# Patient Record
Sex: MALE | Race: WHITE
[De-identification: names, ages, dates, MRNs, and addresses within clinical notes are randomized per-mention and may not be internally consistent; named-entity substitution may affect disease eponyms.]

---

## 2019-12-18 ENCOUNTER — HOSPITAL ENCOUNTER (OUTPATIENT)
Dept: HOSPITAL 96 - M.MRI | Age: 69
End: 2019-12-18
Attending: ORTHOPAEDIC SURGERY
Payer: MEDICARE
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
## 2019-12-18 DIAGNOSIS — S83.242A: Primary | ICD-10-CM

## 2019-12-18 DIAGNOSIS — M17.12: ICD-10-CM
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
## 2019-12-18 DIAGNOSIS — Z96.652: ICD-10-CM

## 2020-01-09 LAB
ALBUMIN SERPL-MCNC: 3.5 G/DL (ref 3.4–5)
ALP SERPL-CCNC: 54 U/L (ref 46–116)
ALT SERPL-CCNC: 21 U/L (ref 30–65)
ANION GAP SERPL CALC-SCNC: 6 MMOL/L (ref 7–16)
AST SERPL-CCNC: 11 U/L (ref 15–37)
BILIRUB SERPL-MCNC: 0.5 MG/DL
BILIRUB UR-MCNC: NEGATIVE MG/DL
BUN SERPL-MCNC: 20 MG/DL (ref 7–18)
CALCIUM SERPL-MCNC: 9 MG/DL (ref 8.5–10.1)
CHLORIDE SERPL-SCNC: 102 MMOL/L (ref 98–107)
CO2 SERPL-SCNC: 28 MMOL/L (ref 21–32)
COLOR UR: YELLOW
CREAT SERPL-MCNC: 1.2 MG/DL (ref 0.6–1.3)
GLUCOSE SERPL-MCNC: 237 MG/DL (ref 70–99)
HCT VFR BLD CALC: 41.6 % (ref 42–52)
HGB BLD-MCNC: 14.4 GM/DL (ref 14–18)
INR PPP: 1.1
KETONES UR STRIP-MCNC: NEGATIVE MG/DL
MCH RBC QN AUTO: 31.9 PG (ref 26–34)
MCHC RBC AUTO-ENTMCNC: 34.6 G/DL (ref 28–37)
MCV RBC: 92.2 FL (ref 80–100)
MPV: 7.4 FL. (ref 7.2–11.1)
PLATELET COUNT*: 170 THOU/UL (ref 150–400)
POTASSIUM SERPL-SCNC: 4.2 MMOL/L (ref 3.5–5.1)
PROT SERPL-MCNC: 7 G/DL (ref 6.4–8.2)
PROT UR QL STRIP: NEGATIVE
PROTHROMBIN TIME: 10.9 SECONDS (ref 9.2–11.5)
RBC # BLD AUTO: 4.51 MIL/UL (ref 4.5–6)
RBC # UR STRIP: NEGATIVE /UL
RDW-CV: 13.6 % (ref 10.5–14.5)
SODIUM SERPL-SCNC: 136 MMOL/L (ref 136–145)
SP GR UR STRIP: >= 1.03 (ref 1–1.03)
URINE CLARITY: CLEAR
URINE GLUCOSE-RANDOM: NEGATIVE
URINE LEUKOCYTES-REFLEX: NEGATIVE
URINE NITRITE-REFLEX: NEGATIVE
UROBILINOGEN UR STRIP-ACNC: 0.2 E.U./DL (ref 0.2–1)
WBC # BLD AUTO: 7.5 THOU/UL (ref 4–11)

## 2020-01-09 NOTE — EKG
[place] [address]
Phone:  [phone]                     ELECTROCARDIOGRAM REPORT      
_______________________________________________________________________________
 
Name:       KESHAWILL L                 Room:                      PRE IN  
[facility]#:  [id number]      Account #:      [id number]  
Admission:               Attend Phys:    SERGEY Goldman
Discharge:               Date of Birth:  [birth year]  
         Report #: [id number]
    [id number]
_______________________________________________________________________________
THIS REPORT FOR:  //name//                      
 
                          [facility]
                                       
Test Date:    2020               Test Time:    09:18:14
Pat Name:     WILL REBOLLEDO             Department:   
Patient ID:   [id number]            Room:          
Gender:       M                        Technician:   
:          1950               Requested By: Mookie Bliss
Order Number: [id number]    Reading MD:   Brian Harper
                                 Measurements
Intervals                              Axis          
Rate:         54                       P:            209
MO:                                    QRS:          -48
QRSD:         132                      T:            34
QT:           449                                    
QTc:          426                                    
                           Interpretive Statements
Possible atrial arrhythmia
 
Nonspecific IVCD with LAD
No previous ECG available for comparison
 
Electronically Signed On 2020 13:46:28 CST by Brian Harper
[email or web address]
 
 
 
 
 
 
 
 
 
 
 
 
 
 
 
 
 
 
  <ELECTRONICALLY SIGNED>
                                           By: Brian Harper MD, [facility]      
  20     1346
D: 20 0918   _____________________________________
T: 20   Brian Harper MD, FACC        /EPI

## 2020-01-21 ENCOUNTER — HOSPITAL ENCOUNTER (INPATIENT)
Dept: HOSPITAL 96 - M.PRE | Age: 70
LOS: 1 days | Discharge: HOME HEALTH | DRG: 470 | End: 2020-01-22
Attending: INTERNAL MEDICINE | Admitting: INTERNAL MEDICINE
Payer: MEDICARE

## 2020-01-21 VITALS — DIASTOLIC BLOOD PRESSURE: 68 MMHG | SYSTOLIC BLOOD PRESSURE: 123 MMHG

## 2020-01-21 VITALS — DIASTOLIC BLOOD PRESSURE: 79 MMHG | SYSTOLIC BLOOD PRESSURE: 128 MMHG

## 2020-01-21 VITALS — WEIGHT: 251.1 LBS | HEIGHT: 70.98 IN | BODY MASS INDEX: 35.15 KG/M2

## 2020-01-21 VITALS — SYSTOLIC BLOOD PRESSURE: 119 MMHG | DIASTOLIC BLOOD PRESSURE: 58 MMHG

## 2020-01-21 DIAGNOSIS — N40.0: ICD-10-CM

## 2020-01-21 DIAGNOSIS — E03.9: ICD-10-CM

## 2020-01-21 DIAGNOSIS — I48.0: ICD-10-CM

## 2020-01-21 DIAGNOSIS — E11.9: ICD-10-CM

## 2020-01-21 DIAGNOSIS — M17.12: Primary | ICD-10-CM

## 2020-01-21 DIAGNOSIS — D68.59: ICD-10-CM

## 2020-01-21 DIAGNOSIS — Z79.899: ICD-10-CM

## 2020-01-21 DIAGNOSIS — Z79.84: ICD-10-CM

## 2020-01-21 DIAGNOSIS — I10: ICD-10-CM

## 2020-01-21 DIAGNOSIS — Z79.01: ICD-10-CM

## 2020-01-21 LAB
ALBUMIN SERPL-MCNC: 3.3 G/DL (ref 3.4–5)
ALP SERPL-CCNC: 50 U/L (ref 46–116)
ALT SERPL-CCNC: 20 U/L (ref 30–65)
ANION GAP SERPL CALC-SCNC: 7 MMOL/L (ref 7–16)
AST SERPL-CCNC: 9 U/L (ref 15–37)
BILIRUB SERPL-MCNC: 0.7 MG/DL
BUN SERPL-MCNC: 20 MG/DL (ref 7–18)
CALCIUM SERPL-MCNC: 8 MG/DL (ref 8.5–10.1)
CHLORIDE SERPL-SCNC: 101 MMOL/L (ref 98–107)
CO2 SERPL-SCNC: 27 MMOL/L (ref 21–32)
CREAT SERPL-MCNC: 1.3 MG/DL (ref 0.6–1.3)
GLUCOSE SERPL-MCNC: 322 MG/DL (ref 70–99)
MAGNESIUM SERPL-MCNC: 1.6 MG/DL (ref 1.8–2.4)
PHOSPHATE SERPL-MCNC: 3.3 MG/DL (ref 2.5–4.9)
POTASSIUM SERPL-SCNC: 5.1 MMOL/L (ref 3.5–5.1)
PROT SERPL-MCNC: 6.4 G/DL (ref 6.4–8.2)
SODIUM SERPL-SCNC: 135 MMOL/L (ref 136–145)

## 2020-01-21 PROCEDURE — 0SRD0J9 REPLACEMENT OF LEFT KNEE JOINT WITH SYNTHETIC SUBSTITUTE, CEMENTED, OPEN APPROACH: ICD-10-PCS | Performed by: ORTHOPAEDIC SURGERY

## 2020-01-22 VITALS — DIASTOLIC BLOOD PRESSURE: 61 MMHG | SYSTOLIC BLOOD PRESSURE: 115 MMHG

## 2020-01-22 VITALS — SYSTOLIC BLOOD PRESSURE: 106 MMHG | DIASTOLIC BLOOD PRESSURE: 58 MMHG

## 2020-01-22 VITALS — SYSTOLIC BLOOD PRESSURE: 105 MMHG | DIASTOLIC BLOOD PRESSURE: 62 MMHG

## 2020-01-22 LAB
ABSOLUTE MONOCYTES: 0.5 THOU/UL (ref 0–1.2)
ALBUMIN SERPL-MCNC: 3 G/DL (ref 3.4–5)
ALP SERPL-CCNC: 44 U/L (ref 46–116)
ALT SERPL-CCNC: 20 U/L (ref 30–65)
ANION GAP SERPL CALC-SCNC: 8 MMOL/L (ref 7–16)
ANISOCYTOSIS BLD QL SMEAR: (no result)
AST SERPL-CCNC: 10 U/L (ref 15–37)
BILIRUB SERPL-MCNC: 0.6 MG/DL
BUN SERPL-MCNC: 19 MG/DL (ref 7–18)
CALCIUM SERPL-MCNC: 8.3 MG/DL (ref 8.5–10.1)
CHLORIDE SERPL-SCNC: 103 MMOL/L (ref 98–107)
CO2 SERPL-SCNC: 25 MMOL/L (ref 21–32)
CREAT SERPL-MCNC: 1 MG/DL (ref 0.6–1.3)
GLUCOSE SERPL-MCNC: 230 MG/DL (ref 70–99)
GRANULOCYTES NFR BLD MANUAL: 86 %
HCT VFR BLD CALC: 35.9 % (ref 42–52)
HGB BLD-MCNC: 12.5 GM/DL (ref 14–18)
LYMPHOCYTES # BLD: 1.3 THOU/UL (ref 0.8–5.3)
LYMPHOCYTES NFR BLD AUTO: 10 %
MCH RBC QN AUTO: 31.9 PG (ref 26–34)
MCHC RBC AUTO-ENTMCNC: 35 G/DL (ref 28–37)
MCV RBC: 91.3 FL (ref 80–100)
MONOCYTES NFR BLD: 4 %
MPV: 8.4 FL. (ref 7.2–11.1)
NEUTROPHILS # BLD: 11.5 THOU/UL (ref 1.6–8.1)
NUCLEATED RBCS: 0 /100WBC
PLATELET # BLD EST: ADEQUATE 10*3/UL
PLATELET COUNT*: 167 THOU/UL (ref 150–400)
POIKILOCYTOSIS BLD QL SMEAR: (no result)
POTASSIUM SERPL-SCNC: 4.5 MMOL/L (ref 3.5–5.1)
PROT SERPL-MCNC: 6 G/DL (ref 6.4–8.2)
RBC # BLD AUTO: 3.93 MIL/UL (ref 4.5–6)
RDW-CV: 13.3 % (ref 10.5–14.5)
SODIUM SERPL-SCNC: 136 MMOL/L (ref 136–145)
WBC # BLD AUTO: 13.4 THOU/UL (ref 4–11)

## 2020-01-22 NOTE — OP
[facility] 
[address] [place]  [address]                    OPERATIVE REPORT              
_______________________________________________________________________________
 
Name:       WILL REBOLLEDO                 Room:           [address] [place]  
.R.#:  [id number]      Account #:      [id number]  
Admission:  01/21/20     Attend Phys:    SERGEY Goldman
Discharge:  01/22/20     Date of Birth:  [birth year]  
         Report #: [id number]
                                                                     [id number]  
_______________________________________________________________________________
THIS REPORT FOR:  //name//                      
 
CC: Mookie Rojas
 
DATE OF SERVICE:  01/21/2020
 
 
PREOPERATIVE DIAGNOSIS:  Left knee osteoarthritis.
 
POSTOPERATIVE DIAGNOSIS:  Left knee osteoarthritis.
 
PROCEDURE:  Left total knee arthroplasty.
 
SURGEON:  Mookie Bliss II, DO.
 
ASSISTANT:  SARAH Schwarz.
 
ANESTHESIA:  General endotracheal.
 
ESTIMATED BLOOD LOSS:  50 mL.
 
ANTIBIOTICS:  Vancomycin preoperatively.
 
DRAINS:  Medium Hemovac.
 
COMPLICATIONS:  None.
 
CONDITION OF THE PATIENT:  Stable to recovery room.
 
IMPLANTS:  Listed in operative record and progress note.
 
BRIEF HISTORY:  The patient was seen in the preoperative area.  Preoperative H
and P was performed.  Site was marked, questions were answered.  The patient did
have a small area over his lateral knee, which was an abrasion from 1 week
prior, which was healing.  The patient was discussed due to this abrasion
surgery may be postponed to allow this to heal completely.  The patient declined
this vehemently and stated he would like to proceed with surgery.  He assumed
all risks and wished to proceed with surgery at this time.  The patient was
warned of all risks.
 
OPERATIVE PROCEDURE:  The patient was taken to the operative suite and placed
supine on the operating table in appropriate anesthesia.  A well-padded
tourniquet applied to upper thigh, which was inflated to 300 mmHg after gravity
exsanguination.  The operative knee was sterilely prepped and draped.  Surgery
 
 
 
[place] [id number]                    OPERATIVE REPORT              
_______________________________________________________________________________
 
Name:       WILL REBOLLEDO ONESIMO                 Room:           [address] [place]  
[facility].#:  [id number]      Account #:      [id number]  
Admission:  01/21/20     Attend Phys:    SERGEY Goldman
Discharge:  01/22/20     Date of Birth:  [birth year]  
         Report #: [id number]
                                                                     [id number]  
_______________________________________________________________________________
began by midline incision.  This was carried down to the subcutaneous tissues. 
A medial parapatellar arthrotomy was performed and carried down to bone.  The
patella was then everted and excess soft tissue removed from around the femur. 
Femoral cutting block was then applied, checked with a drop carlos for rotational
alignment in appropriate position and appropriate cuts were made.  A 4-in-1
cutting block was then applied, checked for rotational alignment, pinned in
appropriate cuts were made.  The tibia was exposed.  Excess meniscus was
removed.  Retractor was placed on collateral ligaments.  The tibial guide block
was then applied, pinned in appropriate position, checked with a drop carlos for
rotational alignment and slope and appropriate cut was made.  The tibial bone
was removed.  Tibial base plate was then applied, checked with drop and
rotational alignment.  Femur was then applied and box cut was reamed.  This was
then trialed with appropriate spacer, which showed excellent fit and fill, and
excellent stability of the knee through all range of motion.  The patella was
reamed in appropriate fashion and sized to appropriate size.  Three peg holes
were drilled and it was then trialed and showed excellent flexion, extension,
excellent tracking of the patella within the groove.  These trials were removed.
 The tibia was punched in appropriate fashion.  Bone ends were cleansed with
Pulsavac irrigation and cement was mixed and applied to final implants.  These
were then malleted into position and held the knee in extension and compressed
to allow cement to cure.  After it cured, excess was removed using a [place] and
osteotome.  Wound was then copiously irrigated and the final spacer was then
malleted into position.  Tourniquet was deflated.  Hemostasis was maintained
with electrocautery.  Pain cocktail was injected.  PRP gel was sprayed
throughout the internal aspects of the knee.  Medium Hemovac drain was applied. 
Capsule was closed with #2 FiberWire and #1 Vicryl in figure-of-eight fashion. 
Skin was closed with 2-0 Vicryl and running 3-0 Monocryl.  Dermabond and sterile
dressing applied.  Ace wrap and PolarCare applied.  The patient transported to
recovery room in stable condition.  Counts were correct throughout the
procedure.
 
 
 
 
 
 
 
 
 
 
 
 
 
 
<ELECTRONICALLY SIGNED>
                                        By:  Mookie Bliss II, DO     
01/22/20     2205
D: 01/22/20 0727_______________________________________
T: 01/22/20 0748Mookie Bliss II, DO        /nt

## 2020-01-22 NOTE — CON
[address]  [address]                    CONSULTATION                  
_______________________________________________________________________________
 
Name:       WILL REBOLLEDO                 Room:           [address] [place]  
.R.#:  [id number]      Account #:      [id number]  
Admission:  01/21/20     Attend Phys:    SERGEY Goldman
Discharge:               Date of Birth:  [birth year]  
         Report #: [id number]
                                                                     [id number]  
_______________________________________________________________________________
THIS REPORT FOR:  //name//                      
 
CC: MD Ortiz Stephens II, MD
 
DATE OF SERVICE:  01/21/2020
 
 
INDICATION:  Recurrent atrial arrhythmias.
 
HISTORY OF PRESENT ILLNESS:  The patient is a 69-year-old gentleman, who was
brought electively to the hospital for knee arthroplasty.  Perioperatively, he
had some atrial tachycardia that appears to be a slow atrial flutter.  He
reports a history of atrial fibrillation with some type of ablation within the
past year.  He is chronically anticoagulated with Eliquis.  This medication was
held 2 days prior to surgery.  He states that he takes flecainide twice a day
and diltiazem at nighttime.  He reports taking these medications
perioperatively.  During surgery and post-surgery, he was given boluses of IV
metoprolol and ultimately placed on a diltiazem drip.  At present, his rhythm
appears stable and rate controlled.  He is not having any symptoms related to
this.  He denies any chest pain or shortness of breath.  He reports a remote
history of cardiac catheterization without intervention.
 
PAST MEDICAL HISTORY:
1.  Paroxysmal atrial arrhythmias, presumably atrial fibrillation, status post
ablation.
2.  Type 2 diabetes.
3.  Hypothyroidism.
4.  Hypertension.
5.  Hypercoagulable state due to atrial arrhythmias.
6.  Chronic anticoagulation.
7.  History of angina.
 
PAST SURGICAL HISTORY:
1.  Knee arthroscopy.
2.  Left elbow cellulitis with status post I and D.
3.  Inguinal hernia repair.
4.  Hemorrhoidectomy.
5.  Cardiac ablation.
 
FAMILY HISTORY:  Noncontributory.
 
 
 
 
[place] [address]                    CONSULTATION                  
_______________________________________________________________________________
 
Name:       WILL REBOLLEDO                 Room:           [address]#:  [id number]      Account #:      [id number]  
Admission:  01/21/20     Attend Phys:    SERGEY Goldman
Discharge:               Date of Birth:  [birth year]  
         Report #: [id number]
                                                                     [id number]  
_______________________________________________________________________________
SOCIAL HISTORY:  The patient quit smoking 10 years ago.  Drinks alcohol
occasionally.
 
PHYSICAL EXAMINATION:
VITAL SIGNS:  Presently stable.  Blood pressure 119/58, heart rate in the 60s
and regular.
GENERAL:  This is a pleasant gentleman, in no distress.  Mood and affect
appropriate.
HEENT:  Head is normocephalic, atraumatic.  Extraocular muscles intact.  Mucous
membranes moist.
NECK:  Shows no jugular venous distention.  There are no carotid bruits.
CARDIAC:  Reveals a regular rhythm.  I do not appreciate gallop or murmur.
CHEST:  Reveals lung fields to be clear anteriorly.
ABDOMEN:  Reveals normal bowel sounds.  The abdomen is soft and nontender.
EXTREMITIES:  Shows the right lower extremity to be wrapped.  Left lower
extremity without edema.  ICD is in place.
 
IMPRESSION AND RECOMMENDATIONS:
1.  Paroxysmal atrial arrhythmias.  I will continue the patient's home
medications of diltiazem and flecainide.  Gradually wean off diltiazem drip.  I
doubt he will need other medications at this time.  Resume anticoagulation when
felt to be safe postoperatively.
2.  Hypertension.  Blood pressure adequately controlled.
3.  Diabetes per primary physician.
4.  Hypercoagulable state secondary to atrial arrhythmias.  We will resume
Eliquis when felt to be safe from a surgical standpoint.
 
 
 
 
 
 
 
 
 
 
 
 
 
 
 
 
 
 
<ELECTRONICALLY SIGNED>
                                        By:  Jeovn Steele MD, FACC   
01/22/20     0812
D: 01/21/20 1612_______________________________________
T: 01/22/20 0026Miccharlotte Steele MD, FACC      /nt